# Patient Record
Sex: FEMALE | Race: WHITE | NOT HISPANIC OR LATINO | Employment: OTHER | URBAN - METROPOLITAN AREA
[De-identification: names, ages, dates, MRNs, and addresses within clinical notes are randomized per-mention and may not be internally consistent; named-entity substitution may affect disease eponyms.]

---

## 2021-06-16 ENCOUNTER — OFFICE VISIT (OUTPATIENT)
Dept: GASTROENTEROLOGY | Facility: CLINIC | Age: 51
End: 2021-06-16
Payer: MEDICARE

## 2021-06-16 VITALS
WEIGHT: 141.7 LBS | DIASTOLIC BLOOD PRESSURE: 80 MMHG | HEIGHT: 61 IN | HEART RATE: 78 BPM | SYSTOLIC BLOOD PRESSURE: 138 MMHG | BODY MASS INDEX: 26.75 KG/M2

## 2021-06-16 DIAGNOSIS — K21.9 GASTROESOPHAGEAL REFLUX DISEASE WITHOUT ESOPHAGITIS: ICD-10-CM

## 2021-06-16 DIAGNOSIS — D69.6 THROMBOCYTOPENIA (HCC): ICD-10-CM

## 2021-06-16 DIAGNOSIS — B18.2 HEPATITIS C VIRUS CARRIER STATE (HCC): Primary | ICD-10-CM

## 2021-06-16 PROCEDURE — 99204 OFFICE O/P NEW MOD 45 MIN: CPT | Performed by: INTERNAL MEDICINE

## 2021-06-16 NOTE — PROGRESS NOTES
[facility] - Outpatient Consultation  Romeo Clemente 46 y o  female MRN: [id number]  Encounter: [id number]          ASSESSMENT AND PLAN:      1  Hepatitis C virus carrier state ([facility] [address] )  - Protime-INR; Future  - Hepatitis C RNA, quantitative, PCR; Future  - Hepatitis C genotype; Future  - AFP tumor marker; Future  - CT abdomen pelvis w contrast; Future  - EGD; Future    2  Thrombocytopenia ([address] )  - EGD; Future    3  Gastroesophageal reflux disease without esophagitis      ______________________________________________________________________    HPI:    The Yaz Díaz is a 70-year-old female that I had seen several years ago for hepatitis- C  Apparently shortly after I saw her her  passed away and as result of that she stop seeing doctors altogether  This is her 1st follow-up since that initial visit  She states she is drinking alcohol 2-3 glasses of wine, 4-5 times per week  Her  was the likely explanation for how she developed hepatitis C since he was a carrier as well  She denies blood transfusions or IV drug abuse  She denies abdominal pain, nausea, vomiting, diarrhea, rectal bleeding, melena, hematemesis, dysphagia but she does admit to constipation as well as to terrible heartburn  She has this heartburn on a daily basis which is  Partially relieved with   Rolaids  She admits to abdominal distention  She also experiences a lot of burping  Her cbc on May 24, 2021 showed hemoglobin 13 4 white blood cell count 4 1, platelet count 118  Comprehensive metabolic panel on May 38DE showed an albumin of 3 4, total bili 1 0, ,   There has been no anatomical testing that has been performed in the last several years  She underwent both esophagogastroduodenoscopy and colonoscopy on May 24, 2018  Both of these examinations were described as normal     REVIEW OF SYSTEMS:    CONSTITUTIONAL: Denies any fever, chills, rigors, and weight loss    HEENT: No earache or tinnitus  Denies hearing loss or visual disturbances  CARDIOVASCULAR: No chest pain or palpitations  RESPIRATORY: Denies any cough, hemoptysis, shortness of breath or dyspnea on exertion  GASTROINTESTINAL: As noted in the History of Present Illness  GENITOURINARY: No problems with urination  Denies any hematuria or dysuria  NEUROLOGIC: No dizziness or vertigo, denies headaches  MUSCULOSKELETAL: Denies any muscle or joint pain  SKIN: Denies skin rashes or itching  ENDOCRINE: Denies excessive thirst  Denies intolerance to heat or cold  PSYCHOSOCIAL: Denies depression or anxiety  Denies any recent memory loss  Historical Information   Past Medical History:   Diagnosis Date    Hepatitis C      Past Surgical History:   Procedure Laterality Date    COLONOSCOPY      UPPER GASTROINTESTINAL ENDOSCOPY       Social History   Social History     Substance and Sexual Activity   Alcohol Use Yes     Social History     Substance and Sexual Activity   Drug Use Never     Social History     Tobacco Use   Smoking Status Never Smoker   Smokeless Tobacco Never Used     Family History   Problem Relation Age of Onset    No Known Problems Father        Meds/Allergies     No current outpatient medications on file  No Known Allergies        Objective     Blood pressure 138/80, pulse 78, height 5' 1" (1 549 m), weight 64 3 kg (141 lb 11 2 oz)  Body mass index is 26 77 kg/m²  PHYSICAL EXAM:      General Appearance:   Alert, cooperative, no distress   HEENT:   Normocephalic, atraumatic, anicteric      Neck:  Supple, symmetrical, trachea midline   Lungs:   Clear to auscultation bilaterally; no rales, rhonchi or wheezing; respirations unlabored    Heart[de-identified]   Regular rate and rhythm; no murmur, rub, or gallop     Abdomen:   Soft, non-tender, non-distended; normal bowel sounds; no masses, positive hepatomegaly, liver edge tender and 4 finger breadths below rib margin    Genitalia:   Deferred    Rectal:   Deferred  Extremities:  No cyanosis, clubbing or edema    Pulses:  2+ and symmetric    Skin:  No jaundice, rashes, or lesions    Lymph nodes:  No palpable cervical lymphadenopathy        Lab Results:   No visits with results within 1 Day(s) from this visit  Latest known visit with results is:   No results found for any previous visit  Radiology Results:   No results found

## 2021-07-30 ENCOUNTER — HOSPITAL ENCOUNTER (OUTPATIENT)
Dept: CT IMAGING | Facility: HOSPITAL | Age: 51
Discharge: HOME | End: 2021-07-30
Attending: INTERNAL MEDICINE
Payer: MEDICARE

## 2021-07-30 DIAGNOSIS — B18.2 HEPATITIS C VIRUS CARRIER STATE (HCC): ICD-10-CM

## 2021-07-30 PROCEDURE — 74177 CT ABD & PELVIS W/CONTRAST: CPT

## 2021-07-30 PROCEDURE — G1004 CDSM NDSC: HCPCS

## 2021-07-30 RX ADMIN — IOHEXOL 100 ML: 350 INJECTION, SOLUTION INTRAVENOUS at 15:24

## 2021-08-03 ENCOUNTER — TELEPHONE (OUTPATIENT)
Dept: GASTROENTEROLOGY | Facility: HOSPITAL | Age: 51
End: 2021-08-03

## 2021-08-04 ENCOUNTER — HOSPITAL ENCOUNTER (OUTPATIENT)
Dept: GASTROENTEROLOGY | Facility: HOSPITAL | Age: 51
Setting detail: OUTPATIENT SURGERY
Discharge: HOME | End: 2021-08-04
Attending: INTERNAL MEDICINE
Payer: MEDICARE

## 2021-08-04 ENCOUNTER — TELEPHONE (OUTPATIENT)
Dept: GASTROENTEROLOGY | Facility: CLINIC | Age: 51
End: 2021-08-04

## 2021-08-04 ENCOUNTER — APPOINTMENT (OUTPATIENT)
Dept: LAB | Facility: HOSPITAL | Age: 51
End: 2021-08-04
Attending: INTERNAL MEDICINE
Payer: MEDICARE

## 2021-08-04 ENCOUNTER — ANESTHESIA (OUTPATIENT)
Dept: GASTROENTEROLOGY | Facility: HOSPITAL | Age: 51
End: 2021-08-04

## 2021-08-04 ENCOUNTER — ANESTHESIA EVENT (OUTPATIENT)
Dept: GASTROENTEROLOGY | Facility: HOSPITAL | Age: 51
End: 2021-08-04

## 2021-08-04 VITALS
HEIGHT: 61 IN | SYSTOLIC BLOOD PRESSURE: 146 MMHG | OXYGEN SATURATION: 98 % | BODY MASS INDEX: 26.76 KG/M2 | DIASTOLIC BLOOD PRESSURE: 76 MMHG | TEMPERATURE: 97.3 F | WEIGHT: 141.76 LBS | HEART RATE: 98 BPM | RESPIRATION RATE: 20 BRPM

## 2021-08-04 DIAGNOSIS — B18.2 HEPATITIS C VIRUS CARRIER STATE (HCC): ICD-10-CM

## 2021-08-04 DIAGNOSIS — D69.6 THROMBOCYTOPENIA (HCC): ICD-10-CM

## 2021-08-04 LAB
AFP-TM SERPL-MCNC: 31 NG/ML (ref 0.5–8)
INR PPP: 1.11 (ref 0.84–1.19)
PROTHROMBIN TIME: 14.6 SECONDS (ref 11.6–14.5)

## 2021-08-04 PROCEDURE — 82105 ALPHA-FETOPROTEIN SERUM: CPT

## 2021-08-04 PROCEDURE — 85610 PROTHROMBIN TIME: CPT

## 2021-08-04 PROCEDURE — 87522 HEPATITIS C REVRS TRNSCRPJ: CPT

## 2021-08-04 PROCEDURE — 87902 NFCT AGT GNTYP ALYS HEP C: CPT

## 2021-08-04 PROCEDURE — 36415 COLL VENOUS BLD VENIPUNCTURE: CPT

## 2021-08-04 PROCEDURE — 43235 EGD DIAGNOSTIC BRUSH WASH: CPT | Performed by: INTERNAL MEDICINE

## 2021-08-04 RX ORDER — PROPOFOL 10 MG/ML
INJECTION, EMULSION INTRAVENOUS AS NEEDED
Status: DISCONTINUED | OUTPATIENT
Start: 2021-08-04 | End: 2021-08-04

## 2021-08-04 RX ORDER — SODIUM CHLORIDE, SODIUM LACTATE, POTASSIUM CHLORIDE, CALCIUM CHLORIDE 600; 310; 30; 20 MG/100ML; MG/100ML; MG/100ML; MG/100ML
125 INJECTION, SOLUTION INTRAVENOUS CONTINUOUS
Status: DISCONTINUED | OUTPATIENT
Start: 2021-08-04 | End: 2021-08-08 | Stop reason: HOSPADM

## 2021-08-04 RX ORDER — SODIUM CHLORIDE, SODIUM LACTATE, POTASSIUM CHLORIDE, CALCIUM CHLORIDE 600; 310; 30; 20 MG/100ML; MG/100ML; MG/100ML; MG/100ML
INJECTION, SOLUTION INTRAVENOUS CONTINUOUS PRN
Status: DISCONTINUED | OUTPATIENT
Start: 2021-08-04 | End: 2021-08-04

## 2021-08-04 RX ADMIN — PROPOFOL 50 MG: 10 INJECTION, EMULSION INTRAVENOUS at 13:10

## 2021-08-04 RX ADMIN — SODIUM CHLORIDE, SODIUM LACTATE, POTASSIUM CHLORIDE, AND CALCIUM CHLORIDE: .6; .31; .03; .02 INJECTION, SOLUTION INTRAVENOUS at 13:02

## 2021-08-04 RX ADMIN — PROPOFOL 50 MG: 10 INJECTION, EMULSION INTRAVENOUS at 13:12

## 2021-08-04 RX ADMIN — PROPOFOL 200 MG: 10 INJECTION, EMULSION INTRAVENOUS at 13:07

## 2021-08-04 NOTE — ANESTHESIA PREPROCEDURE EVALUATION
Procedure:  EGD    Relevant Problems   No relevant active problems        Physical Exam    Airway    Mallampati score: III  TM Distance: >3 FB  Neck ROM: full     Dental       Cardiovascular  Cardiovascular exam normal    Pulmonary  Pulmonary exam normal     Other Findings         1  Hepatitis C virus carrier state ([address] )  - Protime-INR; Future  - Hepatitis C RNA, quantitative, PCR; Future  - Hepatitis C genotype; Future  - AFP tumor marker; Future  - CT abdomen pelvis w contrast; Future  - EGD; Future     2  Thrombocytopenia ([address] )  - EGD; Future     3  Gastroesophageal reflux disease without esophagitis        ______________________________________________________________________     HPI:    The Benny Hernandez is a 70-year-old female that I had seen several years ago for hepatitis- C  Apparently shortly after I saw her her  passed away and as result of that she stop seeing doctors altogether  This is her 1st follow-up since that initial visit  She states she is drinking alcohol 2-3 glasses of wine, 4-5 times per week  Her  was the likely explanation for how she developed hepatitis C since he was a carrier as well  She denies blood transfusions or IV drug abuse  She denies abdominal pain, nausea, vomiting, diarrhea, rectal bleeding, melena, hematemesis, dysphagia but she does admit to constipation as well as to terrible heartburn  She has this heartburn on a daily basis which is  Partially relieved with   Rolaids  She admits to abdominal distention  She also experiences a lot of burping  Her cbc on May 24, 2021 showed hemoglobin 13 4 white blood cell count 4 1, platelet count 103  Comprehensive metabolic panel on May 36UJ showed an albumin of 3 4, total bili 1 0, ,   There has been no anatomical testing that has been performed in the last several years  She underwent both esophagogastroduodenoscopy and colonoscopy on May 24, 2018     Both of these examinations were described as normal  Anesthesia Plan  ASA Score- 2     Anesthesia Type- IV sedation with anesthesia with ASA Monitors  Additional Monitors:   Airway Plan:           Plan Factors-Exercise tolerance (METS): >4 METS  Chart reviewed  Existing labs reviewed  Patient summary reviewed  Induction- intravenous  Postoperative Plan-     Informed Consent- Anesthetic plan and risks discussed with patient  I personally reviewed this patient with the CRNA  Discussed and agreed on the Anesthesia Plan with the CRNA  Sherre Soulier

## 2021-08-04 NOTE — TELEPHONE ENCOUNTER
----- Message from Salena Mcelroy DO sent at 8/4/2021  3:37 PM EDT -----  Please call the patient with the CT scan results  The CT scan shows evidence of cirrhosis of the liver based on a nodular liver edge but no suspicious lesions to suggest cancer were identified

## 2021-08-04 NOTE — TELEPHONE ENCOUNTER
----- Message from Otilia Abdul DO sent at 8/4/2021  3:37 PM EDT -----  Please call the patient with the CT scan results  The CT scan shows evidence of cirrhosis of the liver based on a nodular liver edge but no suspicious lesions to suggest cancer were identified

## 2021-08-04 NOTE — ANESTHESIA POSTPROCEDURE EVALUATION
Post-Op Assessment Note    CV Status:  Stable  Pain Score: 0    Pain management: adequate     Mental Status:  Alert and awake   Hydration Status:  Stable   PONV Controlled:  None   Airway Patency:  Patent      Post Op Vitals Reviewed: Yes      Staff: CRNA         No complications documented      BP  168/85    Temp      Pulse 80   Resp 19   SpO2 100

## 2021-08-04 NOTE — TELEPHONE ENCOUNTER
Called and spoke to patient  Gave patient test results  Patient would like to know what next step is regarding her condition  Will route message to [employer]

## 2021-08-04 NOTE — H&P
History and Physical -  [facility]  Jeanette Ponce 46 y o  female MRN: [id number]      HPI: Jeanette Ponce is a 46y o  year old female who presents for evaluation of gastroesophageal reflux disease and cirrhosis      REVIEW OF SYSTEMS: Per the HPI, and otherwise unremarkable  Historical Information   Past Medical History:   Diagnosis Date    Hepatitis C      Past Surgical History:   Procedure Laterality Date    COLONOSCOPY      UPPER GASTROINTESTINAL ENDOSCOPY       Social History   Social History     Substance and Sexual Activity   Alcohol Use Yes    Comment: 1/5th vodka     Social History     Substance and Sexual Activity   Drug Use Never     Social History     Tobacco Use   Smoking Status Current Some Day Smoker   Smokeless Tobacco Never Used     Family History   Problem Relation Age of Onset    No Known Problems Father        Meds/Allergies     (Not in a hospital admission)      No Known Allergies    Objective     Blood pressure 128/70, pulse 74, temperature 98 °F (36 7 °C), temperature source Temporal, resp  rate 19, height 5' 1" (1 549 m), weight 64 3 kg (141 lb 12 1 oz), SpO2 97 %  PHYSICAL EXAM    Gen: NAD  CV: RRR  CHEST: Clear  ABD: soft, NT/ND  EXT: no edema      ASSESSMENT/PLAN:  This is a 46y o  year old female here for EGD, and she is stable and optimized for her procedure

## 2021-08-05 LAB
HCV RNA SERPL NAA+PROBE-ACNC: NORMAL IU/ML
HCV RNA SERPL NAA+PROBE-LOG IU: 6.43 LOG10 IU/ML
TEST INFORMATION: NORMAL

## 2021-08-05 NOTE — TELEPHONE ENCOUNTER
Please inform patient that we are waiting all laboratories to determine whether she will need treatment for her hepatitis-C  Thank you!

## 2021-08-06 ENCOUNTER — TELEPHONE (OUTPATIENT)
Dept: GASTROENTEROLOGY | Facility: CLINIC | Age: 51
End: 2021-08-06

## 2021-08-06 DIAGNOSIS — B18.2 HEPATITIS C VIRUS CARRIER STATE (HCC): Primary | ICD-10-CM

## 2021-08-06 DIAGNOSIS — B18.2 CHRONIC HEPATITIS C WITHOUT HEPATIC COMA (HCC): ICD-10-CM

## 2021-08-06 NOTE — TELEPHONE ENCOUNTER
Discussed hepatitis c treatment and medication authorization process with patient  She will complete required blood work next week

## 2021-08-06 NOTE — TELEPHONE ENCOUNTER
----- Message from Edilma Lechuga DO sent at 8/5/2021  4:49 PM EDT -----  Patient has a HCV quantitative PCR test of to me in 690,000  She is therefore candidate for hepatitis-C therapy  Please refer this patient on to the registered nurse  Ronny, who is taking over for  Corporation  Never smoker

## 2021-08-08 LAB
HCV GENTYP SERPL NAA+PROBE: NORMAL
HCV PLEASE NOTE: NORMAL

## 2021-08-09 ENCOUNTER — TELEPHONE (OUTPATIENT)
Dept: GASTROENTEROLOGY | Facility: CLINIC | Age: 51
End: 2021-08-09

## 2021-08-09 NOTE — TELEPHONE ENCOUNTER
Dr Yumiko Patel - patient called she is waiting for the prescriptions that Doctor was to send to pharmacy after patient's procedures on 08/04/21   Please call Sofia Will at [phone] ty

## 2021-08-10 NOTE — TELEPHONE ENCOUNTER
Called pt she stated Dr Teressa Arevalo told her he was going to prescribe  medication for the erosion in her esophagus and anti acid medication  She uses [facility] DR LISA MENCHACA in [place]

## 2021-08-18 ENCOUNTER — APPOINTMENT (OUTPATIENT)
Dept: LAB | Facility: HOSPITAL | Age: 51
End: 2021-08-18
Attending: INTERNAL MEDICINE
Payer: MEDICARE

## 2021-08-18 DIAGNOSIS — B19.20 HEPATITIS C VIRUS INFECTION WITHOUT HEPATIC COMA, UNSPECIFIED CHRONICITY: ICD-10-CM

## 2021-08-18 DIAGNOSIS — Z11.59 SCREENING FOR VIRAL DISEASE: ICD-10-CM

## 2021-08-18 LAB
ALBUMIN SERPL BCP-MCNC: 3.6 G/DL (ref 3.5–5)
ALP SERPL-CCNC: 87 U/L (ref 46–116)
ALT SERPL W P-5'-P-CCNC: 244 U/L (ref 12–78)
ANION GAP SERPL CALCULATED.3IONS-SCNC: 11 MMOL/L (ref 4–13)
AST SERPL W P-5'-P-CCNC: 275 U/L (ref 5–45)
BASOPHILS # BLD AUTO: 0.04 THOUSANDS/ΜL (ref 0–0.1)
BASOPHILS NFR BLD AUTO: 1 % (ref 0–1)
BILIRUB SERPL-MCNC: 1.31 MG/DL (ref 0.2–1)
BUN SERPL-MCNC: 16 MG/DL (ref 5–25)
CALCIUM SERPL-MCNC: 9.4 MG/DL (ref 8.3–10.1)
CHLORIDE SERPL-SCNC: 102 MMOL/L (ref 100–108)
CO2 SERPL-SCNC: 25 MMOL/L (ref 21–32)
CREAT SERPL-MCNC: 0.7 MG/DL (ref 0.6–1.3)
EOSINOPHIL # BLD AUTO: 0.13 THOUSAND/ΜL (ref 0–0.61)
EOSINOPHIL NFR BLD AUTO: 2 % (ref 0–6)
ERYTHROCYTE [DISTWIDTH] IN BLOOD BY AUTOMATED COUNT: 13.1 % (ref 11.6–15.1)
GFR SERPL CREATININE-BSD FRML MDRD: 99 ML/MIN/1.73SQ M
GLUCOSE P FAST SERPL-MCNC: 81 MG/DL (ref 65–99)
HCT VFR BLD AUTO: 41.4 % (ref 34.8–46.1)
HGB BLD-MCNC: 14 G/DL (ref 11.5–15.4)
IMM GRANULOCYTES # BLD AUTO: 0.01 THOUSAND/UL (ref 0–0.2)
IMM GRANULOCYTES NFR BLD AUTO: 0 % (ref 0–2)
LYMPHOCYTES # BLD AUTO: 1.78 THOUSANDS/ΜL (ref 0.6–4.47)
LYMPHOCYTES NFR BLD AUTO: 33 % (ref 14–44)
MCH RBC QN AUTO: 33.4 PG (ref 26.8–34.3)
MCHC RBC AUTO-ENTMCNC: 33.8 G/DL (ref 31.4–37.4)
MCV RBC AUTO: 99 FL (ref 82–98)
MONOCYTES # BLD AUTO: 0.44 THOUSAND/ΜL (ref 0.17–1.22)
MONOCYTES NFR BLD AUTO: 8 % (ref 4–12)
NEUTROPHILS # BLD AUTO: 3.06 THOUSANDS/ΜL (ref 1.85–7.62)
NEUTS SEG NFR BLD AUTO: 56 % (ref 43–75)
NRBC BLD AUTO-RTO: 0 /100 WBCS
PLATELET # BLD AUTO: 112 THOUSANDS/UL (ref 149–390)
PMV BLD AUTO: 11.7 FL (ref 8.9–12.7)
POTASSIUM SERPL-SCNC: 3.9 MMOL/L (ref 3.5–5.3)
PROT SERPL-MCNC: 8.9 G/DL (ref 6.4–8.2)
RBC # BLD AUTO: 4.19 MILLION/UL (ref 3.81–5.12)
SODIUM SERPL-SCNC: 138 MMOL/L (ref 136–145)
WBC # BLD AUTO: 5.46 THOUSAND/UL (ref 4.31–10.16)

## 2021-08-18 PROCEDURE — 86706 HEP B SURFACE ANTIBODY: CPT

## 2021-08-18 PROCEDURE — 86704 HEP B CORE ANTIBODY TOTAL: CPT

## 2021-08-18 PROCEDURE — 36415 COLL VENOUS BLD VENIPUNCTURE: CPT

## 2021-08-18 PROCEDURE — 86708 HEPATITIS A ANTIBODY: CPT

## 2021-08-18 PROCEDURE — 82172 ASSAY OF APOLIPOPROTEIN: CPT

## 2021-08-18 PROCEDURE — 80074 ACUTE HEPATITIS PANEL: CPT

## 2021-08-18 PROCEDURE — 84460 ALANINE AMINO (ALT) (SGPT): CPT

## 2021-08-18 PROCEDURE — 80053 COMPREHEN METABOLIC PANEL: CPT

## 2021-08-18 PROCEDURE — 83883 ASSAY NEPHELOMETRY NOT SPEC: CPT

## 2021-08-18 PROCEDURE — 87389 HIV-1 AG W/HIV-1&-2 AB AG IA: CPT

## 2021-08-18 PROCEDURE — 85025 COMPLETE CBC W/AUTO DIFF WBC: CPT

## 2021-08-18 PROCEDURE — 82977 ASSAY OF GGT: CPT

## 2021-08-18 PROCEDURE — 83010 ASSAY OF HAPTOGLOBIN QUANT: CPT

## 2021-08-18 PROCEDURE — 82247 BILIRUBIN TOTAL: CPT

## 2021-08-19 LAB
HAV AB SER QL IA: REACTIVE
HAV IGM SER QL: NORMAL
HBV CORE AB SER QL: ABNORMAL
HBV CORE IGM SER QL: ABNORMAL
HBV SURFACE AB SER-ACNC: <3.1 MIU/ML
HBV SURFACE AG SER QL: ABNORMAL
HCV AB SER QL: ABNORMAL
HIV 1+2 AB+HIV1 P24 AG SERPL QL IA: NORMAL

## 2021-08-20 LAB
A2 MACROGLOB SERPL-MCNC: 521 MG/DL (ref 110–276)
ALT SERPL W P-5'-P-CCNC: 253 IU/L (ref 0–40)
APO A-I SERPL-MCNC: 158 MG/DL (ref 116–209)
BILIRUB SERPL-MCNC: 1.3 MG/DL (ref 0–1.2)
COMMENT: ABNORMAL
FIBROSIS SCORING:: ABNORMAL
FIBROSIS STAGE SERPL QL: ABNORMAL
GGT SERPL-CCNC: 236 IU/L (ref 0–60)
HAPTOGLOB SERPL-MCNC: 28 MG/DL (ref 33–346)
INTERPRETATIONS: ABNORMAL
LIVER FIBR SCORE SERPL CALC.FIBROSURE: 0.94 (ref 0–0.21)
NECROINFLAMM ACTIVITY SCORING:: ABNORMAL
NECROINFLAMMATORY ACT GRADE SERPL QL: ABNORMAL
NECROINFLAMMATORY ACT SCORE SERPL: 0.94 (ref 0–0.17)
SERVICE CMNT-IMP: ABNORMAL

## 2021-08-20 RX ORDER — GLECAPREVIR AND PIBRENTASVIR 40; 100 MG/1; MG/1
3 TABLET, FILM COATED ORAL DAILY
Qty: 84 TABLET | Refills: 1 | Status: SHIPPED | OUTPATIENT
Start: 2021-08-20 | End: 2021-10-15

## 2021-08-20 NOTE — TELEPHONE ENCOUNTER
Mavyret 8 week script entered as per pathway recommendation    Treatment  Naive  Viral Load  2,690,000  Genotype  1a  Fibrosure  0 94-CIRRHOSIS compensated    HBV surface ab less than 3/10  -vaccine recommended    MELD 8  Child Adler A

## 2021-09-02 ENCOUNTER — TELEPHONE (OUTPATIENT)
Dept: GASTROENTEROLOGY | Facility: CLINIC | Age: 51
End: 2021-09-02

## 2021-09-02 NOTE — TELEPHONE ENCOUNTER
Dr Sabrina Morrison called [facility] all they said was they would like to speak with someone in office about prescription   Please call [phone] Case # [id number]

## 2021-09-08 NOTE — TELEPHONE ENCOUNTER
Patient is aware Mavyret 8 week treatment is approved  Education initiated -dose, missed dose, side effects and required blood work  She will contact PCP for hepatitis b vaccine  Post menopause

## 2021-09-09 ENCOUNTER — TELEPHONE (OUTPATIENT)
Dept: GASTROENTEROLOGY | Facility: CLINIC | Age: 51
End: 2021-09-09

## 2021-09-09 ENCOUNTER — TELEPHONE (OUTPATIENT)
Dept: OTHER | Facility: OTHER | Age: 51
End: 2021-09-09

## 2021-09-09 NOTE — TELEPHONE ENCOUNTER
Patient received hepatitis b vaccine today  She is aware it is an IM -intramuscular injection and is a series of three  Reviewed education and reinforced importance of avoiding alcohol  Delivery is scheduled for Monday and she will contact me to confirm delivery /start date

## 2021-09-09 NOTE — TELEPHONE ENCOUNTER
Called and spoke with patient  Let her know that she should call PCP regarding hep B vaccine  She got the one shot this AM  had questions re this vaccine  Also stated that  Nurse that gave it hit a muscle she was concerned about that  Was also given sheet that said shot is given in a series    is one enough  Wants to speak to Oleksandr Knott route message to [facility]

## 2021-10-06 NOTE — TELEPHONE ENCOUNTER
Patient will complete 4 week blood work on Monday 10/11/21  Reports taking medication daily with no side effects

## 2021-10-11 ENCOUNTER — APPOINTMENT (OUTPATIENT)
Dept: LAB | Facility: HOSPITAL | Age: 51
End: 2021-10-11
Attending: INTERNAL MEDICINE
Payer: MEDICARE

## 2021-10-11 DIAGNOSIS — B19.20 HEPATITIS C VIRUS INFECTION WITHOUT HEPATIC COMA, UNSPECIFIED CHRONICITY: ICD-10-CM

## 2021-10-11 LAB
ALBUMIN SERPL BCP-MCNC: 3.5 G/DL (ref 3.5–5)
ALP SERPL-CCNC: 74 U/L (ref 46–116)
ALT SERPL W P-5'-P-CCNC: 55 U/L (ref 12–78)
ANION GAP SERPL CALCULATED.3IONS-SCNC: 8 MMOL/L (ref 4–13)
AST SERPL W P-5'-P-CCNC: 28 U/L (ref 5–45)
BASOPHILS # BLD AUTO: 0.05 THOUSANDS/ΜL (ref 0–0.1)
BASOPHILS NFR BLD AUTO: 1 % (ref 0–1)
BILIRUB SERPL-MCNC: 0.38 MG/DL (ref 0.2–1)
BUN SERPL-MCNC: 21 MG/DL (ref 5–25)
CALCIUM SERPL-MCNC: 10.3 MG/DL (ref 8.3–10.1)
CHLORIDE SERPL-SCNC: 103 MMOL/L (ref 100–108)
CO2 SERPL-SCNC: 31 MMOL/L (ref 21–32)
CREAT SERPL-MCNC: 0.81 MG/DL (ref 0.6–1.3)
EOSINOPHIL # BLD AUTO: 0.23 THOUSAND/ΜL (ref 0–0.61)
EOSINOPHIL NFR BLD AUTO: 4 % (ref 0–6)
ERYTHROCYTE [DISTWIDTH] IN BLOOD BY AUTOMATED COUNT: 11.7 % (ref 11.6–15.1)
GFR SERPL CREATININE-BSD FRML MDRD: 83 ML/MIN/1.73SQ M
GLUCOSE P FAST SERPL-MCNC: 86 MG/DL (ref 65–99)
HCT VFR BLD AUTO: 39.8 % (ref 34.8–46.1)
HGB BLD-MCNC: 13.6 G/DL (ref 11.5–15.4)
IMM GRANULOCYTES # BLD AUTO: 0.05 THOUSAND/UL (ref 0–0.2)
IMM GRANULOCYTES NFR BLD AUTO: 1 % (ref 0–2)
LYMPHOCYTES # BLD AUTO: 2.98 THOUSANDS/ΜL (ref 0.6–4.47)
LYMPHOCYTES NFR BLD AUTO: 52 % (ref 14–44)
MCH RBC QN AUTO: 33.5 PG (ref 26.8–34.3)
MCHC RBC AUTO-ENTMCNC: 34.2 G/DL (ref 31.4–37.4)
MCV RBC AUTO: 98 FL (ref 82–98)
MONOCYTES # BLD AUTO: 0.51 THOUSAND/ΜL (ref 0.17–1.22)
MONOCYTES NFR BLD AUTO: 9 % (ref 4–12)
NEUTROPHILS # BLD AUTO: 1.91 THOUSANDS/ΜL (ref 1.85–7.62)
NEUTS SEG NFR BLD AUTO: 33 % (ref 43–75)
NRBC BLD AUTO-RTO: 0 /100 WBCS
PLATELET # BLD AUTO: 124 THOUSANDS/UL (ref 149–390)
PMV BLD AUTO: 11.3 FL (ref 8.9–12.7)
POTASSIUM SERPL-SCNC: 3.6 MMOL/L (ref 3.5–5.3)
PROT SERPL-MCNC: 8.4 G/DL (ref 6.4–8.2)
RBC # BLD AUTO: 4.06 MILLION/UL (ref 3.81–5.12)
SODIUM SERPL-SCNC: 142 MMOL/L (ref 136–145)
WBC # BLD AUTO: 5.73 THOUSAND/UL (ref 4.31–10.16)

## 2021-10-11 PROCEDURE — 85025 COMPLETE CBC W/AUTO DIFF WBC: CPT

## 2021-10-11 PROCEDURE — 36415 COLL VENOUS BLD VENIPUNCTURE: CPT

## 2021-10-11 PROCEDURE — 87522 HEPATITIS C REVRS TRNSCRPJ: CPT

## 2021-10-11 PROCEDURE — 80053 COMPREHEN METABOLIC PANEL: CPT

## 2021-10-13 ENCOUNTER — TELEPHONE (OUTPATIENT)
Dept: GASTROENTEROLOGY | Facility: CLINIC | Age: 51
End: 2021-10-13

## 2021-10-13 LAB
HCV RNA SERPL NAA+PROBE-ACNC: NORMAL IU/ML
TEST INFORMATION: NORMAL

## 2021-11-08 ENCOUNTER — TELEPHONE (OUTPATIENT)
Dept: GASTROENTEROLOGY | Facility: CLINIC | Age: 51
End: 2021-11-08

## 2022-02-09 ENCOUNTER — APPOINTMENT (OUTPATIENT)
Dept: LAB | Facility: HOSPITAL | Age: 52
End: 2022-02-09
Payer: MEDICARE

## 2022-02-09 DIAGNOSIS — B19.20 HEPATITIS C VIRUS INFECTION WITHOUT HEPATIC COMA, UNSPECIFIED CHRONICITY: ICD-10-CM

## 2022-02-09 PROCEDURE — 36415 COLL VENOUS BLD VENIPUNCTURE: CPT

## 2022-02-09 PROCEDURE — 87522 HEPATITIS C REVRS TRNSCRPJ: CPT

## 2022-02-11 LAB
HCV RNA SERPL NAA+PROBE-ACNC: NORMAL IU/ML
TEST INFORMATION: NORMAL

## 2022-02-14 ENCOUNTER — TELEPHONE (OUTPATIENT)
Dept: GASTROENTEROLOGY | Facility: CLINIC | Age: 52
End: 2022-02-14

## 2022-02-14 NOTE — TELEPHONE ENCOUNTER
----- Message from Hallie Velasquez DO sent at 2/13/2022 11:52 AM EST -----  Please call the patient and make her aware that her HCV PCR quantitative shows no detectable hepatitis-C virus

## 2022-02-14 NOTE — TELEPHONE ENCOUNTER
Called patient and got patients    LMOM with test results as well as office phone number in case she has any questions

## 2022-05-18 DIAGNOSIS — K21.9 GASTROESOPHAGEAL REFLUX DISEASE WITHOUT ESOPHAGITIS: ICD-10-CM

## 2022-05-18 RX ORDER — PANTOPRAZOLE SODIUM 40 MG/1
40 TABLET, DELAYED RELEASE ORAL DAILY
Qty: 30 TABLET | Refills: 0 | Status: SHIPPED | OUTPATIENT
Start: 2022-05-18

## 2022-08-16 DIAGNOSIS — K21.9 GASTROESOPHAGEAL REFLUX DISEASE WITHOUT ESOPHAGITIS: ICD-10-CM

## 2022-08-16 RX ORDER — PANTOPRAZOLE SODIUM 40 MG/1
40 TABLET, DELAYED RELEASE ORAL DAILY
Qty: 30 TABLET | Refills: 0 | Status: SHIPPED | OUTPATIENT
Start: 2022-08-16 | End: 2022-09-07 | Stop reason: SDUPTHER

## 2022-08-16 NOTE — TELEPHONE ENCOUNTER
Medication Refill Request     Name Pantoprazole  Dose/Frequency 40mg   Quantity 90 day supply   Verified pharmacy   [x] [facility]   Verified ordering Provider   [x]  Does patient have enough for the next 3 days?  Yes [] No [x]

## 2022-09-07 ENCOUNTER — OFFICE VISIT (OUTPATIENT)
Dept: GASTROENTEROLOGY | Facility: CLINIC | Age: 52
End: 2022-09-07
Payer: MEDICARE

## 2022-09-07 VITALS
SYSTOLIC BLOOD PRESSURE: 144 MMHG | BODY MASS INDEX: 29.79 KG/M2 | OXYGEN SATURATION: 98 % | HEART RATE: 69 BPM | DIASTOLIC BLOOD PRESSURE: 102 MMHG | HEIGHT: 61 IN | WEIGHT: 157.8 LBS

## 2022-09-07 DIAGNOSIS — K74.69 OTHER CIRRHOSIS OF LIVER (HCC): Primary | ICD-10-CM

## 2022-09-07 DIAGNOSIS — K21.9 GASTROESOPHAGEAL REFLUX DISEASE WITHOUT ESOPHAGITIS: ICD-10-CM

## 2022-09-07 PROCEDURE — 99213 OFFICE O/P EST LOW 20 MIN: CPT | Performed by: PHYSICIAN ASSISTANT

## 2022-09-07 RX ORDER — PANTOPRAZOLE SODIUM 40 MG/1
40 TABLET, DELAYED RELEASE ORAL DAILY
Qty: 90 TABLET | Refills: 3 | Status: SHIPPED | OUTPATIENT
Start: 2022-09-07

## 2022-09-07 NOTE — PROGRESS NOTES
[facility] - Outpatient Follow-up Note  Omer Pierre 45 y o  female MRN: [id number]  Encounter: [id number]          ASSESSMENT AND PLAN:      1  Other cirrhosis of liver (HCC)  -Well-compensated  -Will calculate meld after laboratories are completed  -Ascites:  None  -Hepatic encephalopathy:  None  -Varices:  Patient did have evidence of grade 1 varices in 2021  Will repeat upper endoscopy in 2023      2  Gastroesophageal reflux disease without esophagitis  -Pantoprazole 40 mg daily refilled  -GERD dietary modifications reviewed  -Follow-up in 6 months   ______________________________________________________________________    SUBJECTIVE:    24-year-old female with a past medical history significant for hepatitis-C, cirrhosis of the liver, gastroesophageal reflux disease presents for GI follow-up  Patient was treated in 2021 for hepatitis-C  Patient did clear her virus  Patient's quantitative level in March of 2022 was undetectable  Patient's CT scan from July 2021 did show evidence of cirrhosis of the liver  Patient reports that she drinks alcohol sparingly  She has not had any updated liver laboratories  She does suffer from acid reflux disease which is under good control with pantoprazole 40 mg daily  She reports that she has not had any breakthrough symptoms  She denies any dysphagia, nausea, vomiting  She denies any diarrhea or constipation  She denies any melena or rectal bleeding  Patient did undergo an upper endoscopy last year that showed evidence of distal esophagitis, small hiatal hernia, grade 1 esophageal varices  Patient is up-to-date with her screening colonoscopy  Patient's last colonoscopy was in 2018  REVIEW OF SYSTEMS IS OTHERWISE NEGATIVE        Historical Information   Past Medical History:   Diagnosis Date    Hepatitis C      Past Surgical History:   Procedure Laterality Date    COLONOSCOPY      UPPER GASTROINTESTINAL ENDOSCOPY Social History   Social History     Substance and Sexual Activity   Alcohol Use Yes    Comment: 1/5th voremy     Social History     Substance and Sexual Activity   Drug Use Never     Social History     Tobacco Use   Smoking Status Current Some Day Smoker   Smokeless Tobacco Never Used     Family History   Problem Relation Age of Onset    No Known Problems Father        Meds/Allergies       Current Outpatient Medications:     pantoprazole (PROTONIX) 40 mg tablet    No Known Allergies        Objective     Blood pressure (!) 144/102, pulse 69, height 5' 1" (1 549 m), weight 71 6 kg (157 lb 12 8 oz), SpO2 98 %  Body mass index is 29 82 kg/m²  PHYSICAL EXAM:      General Appearance:   Alert, cooperative, no distress   HEENT:   Normocephalic, atraumatic, anicteric      Neck:  Supple, symmetrical, trachea midline   Lungs:   Clear to auscultation bilaterally; no rales, rhonchi or wheezing; respirations unlabored    Heart[de-identified]   Regular rate and rhythm; no murmur, rub, or gallop  Abdomen:   Soft, non-tender, non-distended; normal bowel sounds; no masses, no organomegaly    Genitalia:   Deferred    Rectal:   Deferred    Extremities:  No cyanosis, clubbing or edema    Pulses:  2+ and symmetric    Skin:  No jaundice, rashes, or lesions    Lymph nodes:  No palpable cervical lymphadenopathy        Lab Results:   No visits with results within 1 Day(s) from this visit  Latest known visit with results is:   Appointment on 02/09/2022   Component Date Value    HCV PCR Quantitative 02/09/2022 HCV Not Detected     Test Information 02/09/2022 Comment          Radiology Results:   No results found

## 2022-09-07 NOTE — LETTER
September 7, 2022     Yamilex Crow  [address] [place] [id number]    Patient: Gus Whaley   YOB: 1970   Date of Visit: 9/7/2022       Dear Dr Liana Cazares: Thank you for referring Gus Whaley to me for evaluation  Below are my notes for this consultation  If you have questions, please do not hesitate to call me  I look forward to following your patient along with you  Sincerely,        Severa Sea, PA-C        CC: No Recipients  Severa Sea, Evie Spencer  9/7/2022 11:23 AM  Incomplete  [facility] - Outpatient Follow-up Note  Gus Whaley 45 y o  female MRN: [id number]  Encounter: [id number]          ASSESSMENT AND PLAN:      1  Other cirrhosis of liver (HCC)  -Well-compensated  -Will calculate meld after laboratories are completed  -Ascites:  None  -Hepatic encephalopathy:  None  -Varices:  Patient did have evidence of grade 1 varices in 2021  Will repeat upper endoscopy in 2023      2  Gastroesophageal reflux disease without esophagitis  -Pantoprazole 40 mg daily refilled  -GERD dietary modifications reviewed  -Follow-up in 6 months   ______________________________________________________________________    SUBJECTIVE:    57-year-old female with a past medical history significant for hepatitis-C, cirrhosis of the liver, gastroesophageal reflux disease presents for GI follow-up  Patient was treated in 2021 for hepatitis-C  Patient did clear her virus  Patient's quantitative level in March of 2022 was undetectable  Patient's CT scan from July 2021 did show evidence of cirrhosis of the liver  Patient reports that she drinks alcohol sparingly  She has not had any updated liver laboratories  She does suffer from acid reflux disease which is under good control with pantoprazole 40 mg daily  She reports that she has not had any breakthrough symptoms  She denies any dysphagia, nausea, vomiting    She denies any diarrhea or constipation  She denies any melena or rectal bleeding  Patient did undergo an upper endoscopy last year that showed evidence of distal esophagitis, small hiatal hernia, grade 1 esophageal varices  Patient is up-to-date with her screening colonoscopy  Patient's last colonoscopy was in 2018  REVIEW OF SYSTEMS IS OTHERWISE NEGATIVE  Historical Information   Past Medical History:   Diagnosis Date    Hepatitis C      Past Surgical History:   Procedure Laterality Date    COLONOSCOPY      UPPER GASTROINTESTINAL ENDOSCOPY       Social History   Social History     Substance and Sexual Activity   Alcohol Use Yes    Comment: 1/5th vodka     Social History     Substance and Sexual Activity   Drug Use Never     Social History     Tobacco Use   Smoking Status Current Some Day Smoker   Smokeless Tobacco Never Used     Family History   Problem Relation Age of Onset    No Known Problems Father        Meds/Allergies       Current Outpatient Medications:     pantoprazole (PROTONIX) 40 mg tablet    No Known Allergies        Objective     Blood pressure (!) 144/102, pulse 69, height 5' 1" (1 549 m), weight 71 6 kg (157 lb 12 8 oz), SpO2 98 %  Body mass index is 29 82 kg/m²  PHYSICAL EXAM:      General Appearance:   Alert, cooperative, no distress   HEENT:   Normocephalic, atraumatic, anicteric      Neck:  Supple, symmetrical, trachea midline   Lungs:   Clear to auscultation bilaterally; no rales, rhonchi or wheezing; respirations unlabored    Heart[de-identified]   Regular rate and rhythm; no murmur, rub, or gallop  Abdomen:   Soft, non-tender, non-distended; normal bowel sounds; no masses, no organomegaly    Genitalia:   Deferred    Rectal:   Deferred    Extremities:  No cyanosis, clubbing or edema    Pulses:  2+ and symmetric    Skin:  No jaundice, rashes, or lesions    Lymph nodes:  No palpable cervical lymphadenopathy        Lab Results:   No visits with results within 1 Day(s) from this visit     Latest known visit with results is:   Appointment on 02/09/2022   Component Date Value    HCV PCR Quantitative 02/09/2022 HCV Not Detected     Test Information 02/09/2022 Comment          Radiology Results:   No results found  Khoi Ramirez PA-C  9/7/2022 11:19 AM  Sign when Signing Visit  [facility] - Outpatient Follow-up Note  Jorge Roth 45 y o  female MRN: [id number]  Encounter: [id number]          ASSESSMENT AND PLAN:      1  Other cirrhosis of liver (HCC)    - CT abdomen pelvis w contrast; Future  - Comprehensive metabolic panel; Future  - CBC and differential; Future  - Protime-INR; Future  - AFP tumor marker; Future  - Hepatitis C RNA, quantitative, PCR; Future    2  Gastroesophageal reflux disease without esophagitis    - pantoprazole (PROTONIX) 40 mg tablet; Take 1 tablet (40 mg total) by mouth daily  Dispense: 90 tablet; Refill: 3    ______________________________________________________________________    SUBJECTIVE:    57-year-old female with a past medical history significant for hepatitis-C, cirrhosis of the liver, gastroesophageal reflux disease presents for GI follow-up  REVIEW OF SYSTEMS IS OTHERWISE NEGATIVE        Historical Information   Past Medical History:   Diagnosis Date    Hepatitis C      Past Surgical History:   Procedure Laterality Date    COLONOSCOPY      UPPER GASTROINTESTINAL ENDOSCOPY       Social History   Social History     Substance and Sexual Activity   Alcohol Use Yes    Comment: 1/5th kristen     Social History     Substance and Sexual Activity   Drug Use Never     Social History     Tobacco Use   Smoking Status Current Some Day Smoker   Smokeless Tobacco Never Used     Family History   Problem Relation Age of Onset    No Known Problems Father        Meds/Allergies       Current Outpatient Medications:     pantoprazole (PROTONIX) 40 mg tablet    No Known Allergies        Objective     Blood pressure (!) 144/102, pulse 69, height 5' 1" (1 549 m), weight 71 6 kg (157 lb 12 8 oz), SpO2 98 %  Body mass index is 29 82 kg/m²  PHYSICAL EXAM:      General Appearance:   Alert, cooperative, no distress   HEENT:   Normocephalic, atraumatic, anicteric      Neck:  Supple, symmetrical, trachea midline   Lungs:   Clear to auscultation bilaterally; no rales, rhonchi or wheezing; respirations unlabored    Heart[de-identified]   Regular rate and rhythm; no murmur, rub, or gallop  Abdomen:   Soft, non-tender, non-distended; normal bowel sounds; no masses, no organomegaly    Genitalia:   Deferred    Rectal:   Deferred    Extremities:  No cyanosis, clubbing or edema    Pulses:  2+ and symmetric    Skin:  No jaundice, rashes, or lesions    Lymph nodes:  No palpable cervical lymphadenopathy        Lab Results:   No visits with results within 1 Day(s) from this visit  Latest known visit with results is:   Appointment on 02/09/2022   Component Date Value    HCV PCR Quantitative 02/09/2022 HCV Not Detected     Test Information 02/09/2022 Comment          Radiology Results:   No results found

## 2023-01-16 ENCOUNTER — TELEPHONE (OUTPATIENT)
Dept: GASTROENTEROLOGY | Facility: CLINIC | Age: 53
End: 2023-01-16

## 2023-06-14 ENCOUNTER — TELEPHONE (OUTPATIENT)
Dept: GASTROENTEROLOGY | Facility: CLINIC | Age: 53
End: 2023-06-14

## 2024-03-13 NOTE — TELEPHONE ENCOUNTER
Left message on voice mail  -Final HCV RNA not detected  SVR achieved  Call back with any questions  RANGE OF MOTION LIMITED/TENDERNESS

## 2024-04-12 ENCOUNTER — TELEPHONE (OUTPATIENT)
Dept: GASTROENTEROLOGY | Facility: CLINIC | Age: 54
End: 2024-04-12

## 2024-07-26 ENCOUNTER — NURSE TRIAGE (OUTPATIENT)
Age: 54
End: 2024-07-26

## 2024-07-26 NOTE — TELEPHONE ENCOUNTER
Last OV: 9/7/22   Hx: cirrhosis of liver, GERD       Pt calling in, reports last week started with intense burning pain in epigastric region, upper abdominal fullness, belching and bitter taste in mouth.   She is eating small portions, GERD diet, sitting up after meals and avoids eating prior to bed time.     Pt reports in the past being on pantoprazole which helped her. Last OV was in 2022 and I advised pt we should see her in office.    I scheduled pt to see PA in August and advise mylanta and Pepcid in the mean time. Harnett diet and avoid eating 2 hours prior to bed time. She understood, no further questions     Reason for Disposition   The patient has epigastric pain for <3 months    Answer Questions - Initial Assesment - Gerd Recurrent  When did your symptoms start: last week   How often are you experiencing symptoms: daily constant     When are your symptoms occurring: after eating     Are you currently taking: none     Have you tried any OTC medications: Tums    Have any OTC medications resolved or lessened your symptoms: no    What recent testing has the patient had:     Are you following the diet and lifestyle modifications: yes    Any recent changes in your bowel habits: no    Protocols used: GERD

## 2024-08-23 ENCOUNTER — OFFICE VISIT (OUTPATIENT)
Dept: GASTROENTEROLOGY | Facility: CLINIC | Age: 54
End: 2024-08-23
Payer: COMMERCIAL

## 2024-08-23 VITALS
DIASTOLIC BLOOD PRESSURE: 86 MMHG | HEART RATE: 71 BPM | TEMPERATURE: 97.6 F | OXYGEN SATURATION: 96 % | WEIGHT: 165.2 LBS | HEIGHT: 61 IN | SYSTOLIC BLOOD PRESSURE: 122 MMHG | BODY MASS INDEX: 31.19 KG/M2

## 2024-08-23 DIAGNOSIS — K74.69 OTHER CIRRHOSIS OF LIVER (HCC): Primary | ICD-10-CM

## 2024-08-23 DIAGNOSIS — K21.9 GASTROESOPHAGEAL REFLUX DISEASE WITHOUT ESOPHAGITIS: ICD-10-CM

## 2024-08-23 DIAGNOSIS — I85.10 SECONDARY ESOPHAGEAL VARICES WITHOUT BLEEDING (HCC): ICD-10-CM

## 2024-08-23 PROCEDURE — 99214 OFFICE O/P EST MOD 30 MIN: CPT | Performed by: PHYSICIAN ASSISTANT

## 2024-08-23 RX ORDER — PANTOPRAZOLE SODIUM 40 MG/1
40 TABLET, DELAYED RELEASE ORAL DAILY
Qty: 90 TABLET | Refills: 3 | Status: SHIPPED | OUTPATIENT
Start: 2024-08-23

## 2024-08-23 RX ORDER — FLUOXETINE 40 MG/1
CAPSULE ORAL
COMMUNITY
Start: 2024-07-17

## 2024-08-23 RX ORDER — TRAZODONE HYDROCHLORIDE 50 MG/1
50 TABLET, FILM COATED ORAL
COMMUNITY
Start: 2024-07-17

## 2024-08-23 RX ORDER — CLONIDINE HYDROCHLORIDE 0.1 MG/1
TABLET ORAL
COMMUNITY
Start: 2024-08-22

## 2024-08-23 NOTE — PATIENT INSTRUCTIONS
Scheduled date of EGD(as of today):8/28/24  Physician performing EGD:Romy  Location of EGD:[place]  Instructions reviewed with patient by:Shashank nielson  Clearances:  none

## 2024-08-23 NOTE — PROGRESS NOTES
[facility] - Outpatient Follow-up Note  Cindy Colbert 54 y.o. female MRN: [id number]  Encounter: [id number]          ASSESSMENT AND PLAN:      1. HCV Cirrhosis s/p treatment with Mavyret in 2021 with successful SVR achieved     Patient presents for follow up of her HCV Cirrhosis s/p treatment with Mavyret in 2021 with successful SVR achieved.  She is well compensated.  Will update labs to detemine MELD score.    Varices: Hx of Grade 1 varices on EGD in 2021.  Will plan for repeat EGD to investigate.  HE: None, grade 0.  Ascites: No abdominal distention and no LE edema, will follow up ultrasound. <2grams of sodium daily.  HCC Screen: Will check an abdominal ultrasound and AFP tumor marker.  We reviewed imaging q 6 months for HCC screening.  Do not take more than 2 grams of Tylenol in a 24 hour period.Discussed importance of no alcohol.  Follow up in 6 months.    ______________________________________________________________________    SUBJECTIVE:  Patient is a pleasant 54 year old female with a PMH of HCV cirrhosis, esophageal varices, chronic GERD who presents to the office for follow up.  She reports she has been out of her Pantoprazole and her reflux symptoms are significant.  She was last seen in the office in 2022.  No abdominal distention or lower extremity edema. No hematemesis. No blood in the stool.  She had a normal colonoscopy in 2018. No family history of colon cancer. Last EGD in 2021 showed grade 1 varices. No recent imaging of her liver.  CBC and CMP in the spring showed normal LFTs and normal PLT level.      REVIEW OF SYSTEMS IS OTHERWISE NEGATIVE.      Historical Information   Past Medical History:   Diagnosis Date    Hepatitis C      Past Surgical History:   Procedure Laterality Date    COLONOSCOPY      UPPER GASTROINTESTINAL ENDOSCOPY       Social History   Social History     Substance and Sexual Activity   Alcohol Use Yes    Comment: 1/5th kristen     Social History  "    Substance and Sexual Activity   Drug Use Never     Social History     Tobacco Use   Smoking Status Some Days   Smokeless Tobacco Never     Family History   Problem Relation Age of Onset    No Known Problems Father        Meds/Allergies       Current Outpatient Medications:     cloNIDine (CATAPRES) 0.1 mg tablet    FLUoxetine (PROzac) 20 mg capsule    FLUoxetine (PROzac) 40 MG capsule    pantoprazole (PROTONIX) 40 mg tablet    traZODone (DESYREL) 50 mg tablet    No Known Allergies        Objective     Blood pressure 122/86, pulse 71, temperature 97.6 °F (36.4 °C), temperature source Temporal, height 5' 1\" (1.549 m), weight 74.9 kg (165 lb 3.2 oz), SpO2 96%. Body mass index is 31.21 kg/m².      PHYSICAL EXAM:      General Appearance:   Alert, cooperative, no distress   HEENT:   Normocephalic, atraumatic, anicteric.     Neck:  Supple, symmetrical, trachea midline   Lungs:   Clear to auscultation bilaterally; no rales, rhonchi or wheezing; respirations unlabored    Heart::   Regular rate and rhythm; no murmur, rub, or gallop.   Abdomen:   Soft, non-tender, non-distended; normal bowel sounds; no masses, no organomegaly    Genitalia:   Deferred    Rectal:   Deferred    Extremities:  No cyanosis, clubbing or edema    Pulses:  2+ and symmetric    Skin:  No jaundice, rashes, or lesions    Lymph nodes:  No palpable cervical lymphadenopathy        Lab Results:   No visits with results within 1 Day(s) from this visit.   Latest known visit with results is:   Appointment on 02/09/2022   Component Date Value    HCV PCR Quantitative 02/09/2022 HCV Not Detected     Test Information 02/09/2022 Comment          Radiology Results:   No results found.  "

## 2024-09-06 ENCOUNTER — TELEPHONE (OUTPATIENT)
Dept: GASTROENTEROLOGY | Facility: CLINIC | Age: 54
End: 2024-09-06

## 2025-07-17 ENCOUNTER — TELEPHONE (OUTPATIENT)
Dept: GASTROENTEROLOGY | Facility: AMBULARY SURGERY CENTER | Age: 55
End: 2025-07-17

## 2025-07-17 NOTE — TELEPHONE ENCOUNTER
Pt hasn't been seen by our office in almost a year. She would like a call back regarding some questions about lab work. She is having some labs done by her PCP to check her liver and kidneys and wanted to see if Dr Stanton would like labs ordered as well prior to her scheduling an appt. I offered to schedule a follow up visit but she said it makes no sense if there are no labs to review. States that she would like a call back from a clinical person

## 2025-07-17 NOTE — TELEPHONE ENCOUNTER
Called pt LMOM advising providers message. Labs orders from 8/23/24 are still valid and pt should get these done and the US Abdomen completed. Provided pt with Central scheduling/office telephone numbers to call to schedule US and schedule office office. If any questions to contact the office.